# Patient Record
Sex: MALE | Race: WHITE | NOT HISPANIC OR LATINO | Employment: STUDENT | URBAN - METROPOLITAN AREA
[De-identification: names, ages, dates, MRNs, and addresses within clinical notes are randomized per-mention and may not be internally consistent; named-entity substitution may affect disease eponyms.]

---

## 2020-10-06 ENCOUNTER — OFFICE VISIT (OUTPATIENT)
Dept: URGENT CARE | Facility: CLINIC | Age: 25
End: 2020-10-06
Payer: COMMERCIAL

## 2020-10-06 VITALS
WEIGHT: 265 LBS | DIASTOLIC BLOOD PRESSURE: 94 MMHG | SYSTOLIC BLOOD PRESSURE: 146 MMHG | HEART RATE: 80 BPM | TEMPERATURE: 97 F | BODY MASS INDEX: 35.89 KG/M2 | HEIGHT: 72 IN | RESPIRATION RATE: 17 BRPM | OXYGEN SATURATION: 98 %

## 2020-10-06 DIAGNOSIS — Z02.83 ENCOUNTER FOR DRUG SCREENING: ICD-10-CM

## 2020-10-06 DIAGNOSIS — T14.90XA TRAUMA: ICD-10-CM

## 2020-10-06 DIAGNOSIS — S52.122A CLOSED DISPLACED FRACTURE OF HEAD OF LEFT RADIUS, INITIAL ENCOUNTER: Primary | ICD-10-CM

## 2020-10-06 LAB
CTP QC/QA: YES
POC 5 PANEL DRUG SCREEN: NEGATIVE

## 2020-10-06 PROCEDURE — 80305 DRUG TEST PRSMV DIR OPT OBS: CPT | Mod: QW,S$GLB,, | Performed by: FAMILY MEDICINE

## 2020-10-06 PROCEDURE — 99203 OFFICE O/P NEW LOW 30 MIN: CPT | Mod: S$GLB,,, | Performed by: FAMILY MEDICINE

## 2020-10-06 PROCEDURE — 73080 XR ELBOW COMPLETE 3 VIEW LEFT: ICD-10-PCS | Mod: FY,LT,S$GLB, | Performed by: RADIOLOGY

## 2020-10-06 PROCEDURE — 80305 POCT RAPID DRUG SCREEN 5 PANEL: ICD-10-PCS | Mod: QW,S$GLB,, | Performed by: FAMILY MEDICINE

## 2020-10-06 PROCEDURE — 73080 X-RAY EXAM OF ELBOW: CPT | Mod: FY,LT,S$GLB, | Performed by: RADIOLOGY

## 2020-10-06 PROCEDURE — 99203 PR OFFICE/OUTPT VISIT, NEW, LEVL III, 30-44 MIN: ICD-10-PCS | Mod: S$GLB,,, | Performed by: FAMILY MEDICINE

## 2020-10-06 NOTE — PROGRESS NOTES
Subjective:       Patient ID: Nacho Torres is a 25 y.o. male.    Vitals:  height is 6' (1.829 m) and weight is 120.2 kg (265 lb). His temperature is 97.1 °F (36.2 °C). His blood pressure is 146/94 (abnormal) and his pulse is 80. His respiration is 17 and oxygen saturation is 98%.     Chief Complaint: Arm Injury    Pt states that he fell off a 6ft ladder while at work today injuring his left elbow. He states that he fell about 4 feet.  He reports falling sideways and his elbow was completely extended and along his side, and he landed in this position with first contact being his elbow The incident happened at about 15:20.  No previous injury to this elbow.    Arm Injury   The incident occurred 1 to 3 hours ago. The incident occurred at work. The injury mechanism was a fall. The pain is present in the left elbow. The quality of the pain is described as shooting. The pain radiates to the left arm. The pain is at a severity of 8/10. The pain is severe. The pain has been worsening since the incident. The symptoms are aggravated by movement and palpation. He has tried ice for the symptoms. The treatment provided no relief.       Constitution: Negative for fatigue.   HENT: Negative for facial swelling and facial trauma.    Neck: Negative for neck stiffness.   Cardiovascular: Negative for chest trauma.   Eyes: Negative for eye trauma, double vision and blurred vision.   Gastrointestinal: Negative for abdominal trauma, abdominal pain and rectal bleeding.   Genitourinary: Negative for hematuria, genital trauma and pelvic pain.   Musculoskeletal: Positive for joint pain, joint swelling and abnormal ROM of joint. Negative for pain, trauma and pain with walking.   Skin: Negative for color change, wound, abrasion and laceration.   Neurological: Negative for dizziness, history of vertigo, light-headedness, coordination disturbances, altered mental status and loss of consciousness.   Hematologic/Lymphatic: Negative for history of  bleeding disorder.   Psychiatric/Behavioral: Negative for altered mental status.       Objective:      Physical Exam   Constitutional:  Non-toxic appearance. He does not appear ill. No distress.   HENT:   Head: Normocephalic and atraumatic.   Ears:   Right Ear: Tympanic membrane and external ear normal.   Left Ear: Tympanic membrane and external ear normal.   Eyes: Pupils are equal, round, and reactive to light.   Neck: Normal range of motion. No neck rigidity.   Cardiovascular: Normal rate, regular rhythm and normal heart sounds.   Pulmonary/Chest: Effort normal and breath sounds normal. No stridor. No respiratory distress. He has no wheezes. He has no rhonchi. He has no rales.   Abdominal: Normal appearance.   Musculoskeletal:         General: Swelling and signs of injury present.      Comments: Guarding left elbow.  Minimal point tenderness over radial head although moderate swelling of left elbow.  Unable to fully extend left elbow due to pain.  Flexing limited to just greater than 90°.  Peripheral pulses present and equal.   Neurological: He is alert.   Skin: Skin is not diaphoretic.       XRAY: Acute mild displaced fracture is seen of the radial head.  No additional acute displaced fractures or dislocation seen.  Suspected small elbow joint effusion is also visualized.     Assessment:       1. Closed displaced fracture of head of left radius, initial encounter    2. Encounter for drug screening    3. Trauma        Plan:         Closed displaced fracture of head of left radius, initial encounter  -     HME - OTHER  -     Ambulatory referral/consult to Occupational Medicine    Encounter for drug screening  -     POCT Rapid Drug Screen 5 Panel    Trauma  -     XR ELBOW COMPLETE 3 VIEW LEFT; Future; Expected date: 10/06/2020    USE ICE REGULARLY AND KEEP ELEVATED AS OFTEN AS POSSIBLE, ESPECIALLY AT FIRST.    TRY IBUPROFEN 600-800 MG EVERY 6 HR AS NEEDED.    YOU WILL NEED TO FOLLOW-UP WITH OCCUPATIONAL HEALTH  TOMORROW, AS OUTLINED ON THE SEPARATE SHEET.  I WOULD SUGGEST CALLING FIRST.

## 2020-10-06 NOTE — PATIENT INSTRUCTIONS
Elbow Fracture    You have a break (fracture) of one or more bones of your elbow joint. This may be a small crack in the bone. Or it may be a major break, with the broken parts pushed out of position.  This fracture usually takes 4 to 12 weeks to heal, depending on the type. The first step in treatment is with a splint or cast. Severe fractures may need surgery to put the bone fragments back into place.  Home care  Follow these guidelines when caring for yourself at home:  · Keep your arm elevated to reduce pain and swelling. When sitting or lying down keep your arm above the level of your heart. You can do this by placing your arm on a pillow that rests on your chest or on a pillow at your side. This is most important during the first 2 days (48 hours) after the injury.  · Put an ice pack on the injured area. Do this for 20 minutes every 1 to 2 hours the first day. You can make an ice pack by wrapping a plastic bag of ice cubes in a thin towel. As the ice melts, be careful that the cast or splint doesnt get wet. You can place the ice pack inside the sling and directly over the splint or cast. Continue to use the ice pack 3 to 4 times a day for the next 2 days. Then use the ice pack as needed to ease pain and swelling.  · Keep the splint or cast completely dry at all times. Bathe with your splint or cast out of the water. Protect it with a large plastic bag, rubber-banded at the top end. If a fiberglass splint or cast gets wet, you can dry it with a hair dryer.  · You may use acetaminophen or ibuprofen to control pain, unless another pain medicine was prescribed. If you have chronic liver or kidney disease, talk with your healthcare provider before using these medicines. Also talk with your provider if youve had a stomach ulcer or gastrointestinal bleeding.  · Dont put creams or objects under the cast if you have itching.  Follow-up care  Follow up with your healthcare provider in 1 week, or as advised. This is  to make sure the bone is healing the way it should. If a splint was put on, it may be changed to a cast during your follow-up visit.  X-rays may be taken. You will be told of any new findings that may affect your care.  When to seek medical advice  Call your healthcare provider right away if any of these occur:  · The cast or splint cracks  · The plaster cast or splint becomes wet or soft  · The fiberglass cast or splint stays wet for more than 24 hours  · Tightness or pain under the cast or splint gets worse  · Bad odor from the cast or wound fluid stains the cast  · Fingers become swollen, cold, blue, numb, or tingly  · You cant move your fingers  · Skin around cast becomes red  · Fever of 100.4ºF (38ºC) or higher, or as directed by your healthcare provider   Date Last Reviewed: 2/6/2017  © 1844-0250 [employer]. [address] [place] [address]. All rights reserved. This information is not intended as a substitute for professional medical care. Always follow your healthcare professional's instructions.      USE ICE REGULARLY AND KEEP ELEVATED AS OFTEN AS POSSIBLE, ESPECIALLY AT FIRST.    TRY IBUPROFEN 600-800 MG EVERY 6 HR AS NEEDED.    YOU WILL NEED TO FOLLOW-UP WITH OCCUPATIONAL HEALTH TOMORROW, AS OUTLINED ON THE SEPARATE SHEET.  I WOULD SUGGEST CALLING FIRST.

## 2020-10-06 NOTE — LETTER
[facility]  [address] [facility] [address]  Phone: [phone]  Fax: [phone]  [facility]: [phone]    Pt Name: Nacho Torres  Injury Date: 10/06/2020   Employee ID:  Date of First Treatment: 10/06/2020   Company: [employer]       Appointment Time: 04:55 PM Arrived: 5:13 PM   Provider: Kellie Donnelly MD Time Out: 6:20PM     Office Treatment:   1. Closed displaced fracture of head of left radius, initial encounter    2. Encounter for drug screening    3. Trauma                     YOU WILL NEED TO FOLLOW-UP WITH OCCUPATIONAL HEALTH TOMORROW, AS OUTLINED ON THE SEPARATE SHEET.

## 2021-04-26 ENCOUNTER — PATIENT MESSAGE (OUTPATIENT)
Dept: RESEARCH | Facility: HOSPITAL | Age: 26
End: 2021-04-26